# Patient Record
Sex: MALE | Race: WHITE | Employment: FULL TIME | URBAN - METROPOLITAN AREA
[De-identification: names, ages, dates, MRNs, and addresses within clinical notes are randomized per-mention and may not be internally consistent; named-entity substitution may affect disease eponyms.]

---

## 2019-01-17 ENCOUNTER — OFFICE VISIT (OUTPATIENT)
Dept: ORTHOPEDIC SURGERY | Age: 34
End: 2019-01-17

## 2019-01-17 VITALS
HEART RATE: 69 BPM | DIASTOLIC BLOOD PRESSURE: 71 MMHG | TEMPERATURE: 97.6 F | HEIGHT: 72 IN | WEIGHT: 234 LBS | BODY MASS INDEX: 31.69 KG/M2 | SYSTOLIC BLOOD PRESSURE: 133 MMHG | OXYGEN SATURATION: 97 % | RESPIRATION RATE: 16 BRPM

## 2019-01-17 DIAGNOSIS — S76.319A HAMSTRING STRAIN, INITIAL ENCOUNTER: Primary | ICD-10-CM

## 2019-01-17 RX ORDER — MELOXICAM 15 MG/1
15 TABLET ORAL
Qty: 30 TAB | Refills: 1 | Status: SHIPPED | OUTPATIENT
Start: 2019-01-17

## 2019-01-17 NOTE — PROGRESS NOTES
Jl Bell 1985 Chief Complaint Patient presents with  Leg Pain LEFT LEG PAIN  
  
 
HISTORY OF PRESENT ILLNESS Jl Bell is a 28 y.o. male who presents today for evaluation of left leg pain. He rates his pain 5/10 today. Pain has been present since 1/16/19 when he was playing hockey. He states he was playing [employer] and did the splits which resulted in sharp pain. Pain with walking and standing, he reports a limp. Patient describes the pain as aching and sharp that is Constant in nature. Symptoms are worse with standing and walking, sitting, squatting, Activity and is better with  nothing . Associated symptoms include bruising, Swelling. Since problem started, it: is unchanged. Pain does not wake patient up at night. Has taken no meds for the problem. Has tried following treatments: Injections:NO; Brace:NO; Therapy:NO; Cane/Crutch:NO No Known Allergies Past Medical History:  
Diagnosis Date  Kidney stones  Patellar instability of left knee 4/11/2016 Social History Socioeconomic History  Marital status:  Spouse name: Not on file  Number of children: Not on file  Years of education: Not on file  Highest education level: Not on file Social Needs  Financial resource strain: Not on file  Food insecurity - worry: Not on file  Food insecurity - inability: Not on file  Transportation needs - medical: Not on file  Transportation needs - non-medical: Not on file Occupational History  Not on file Tobacco Use  Smoking status: Never Smoker  Smokeless tobacco: Never Used Substance and Sexual Activity  Alcohol use: Yes Comment: occassionally  Drug use: No  
 Sexual activity: Yes  
  Partners: Female Other Topics Concern  Not on file Social History Narrative  Not on file Past Surgical History:  
Procedure Laterality Date  HX HEENT  2001  
 jaw surgery  HX HEENT    
 tubes in ears [address] [address] Family History Problem Relation Age of Onset  Elevated Lipids Father  Diabetes Maternal Grandmother Current Outpatient Medications Medication Sig  
 albuterol (PROVENTIL HFA, VENTOLIN HFA, PROAIR HFA) 90 mcg/actuation inhaler Take 2 Puffs by inhalation every four (4) hours as needed for Wheezing.  guaiFENesin-codeine (ROBITUSSIN AC)  mg/5 mL solution Take 5 mL by mouth nightly as needed for Cough. Max Daily Amount: 5 mL. No current facility-administered medications for this visit. REVIEW OF SYSTEM Patient denies: Weight loss, Fever/Chills, HA, Visual changes, Fatigue, Chest pain, SOB, Abdominal pain, N/V/D/C, Blood in stool or urine, Edema. Pertinent positive as above in HPI. All others were negative PHYSICAL EXAM:  
Visit Vitals /71 Pulse 69 Temp 97.6 °F (36.4 °C) (Oral) Resp 16 Ht 6' (1.829 m) Wt 234 lb (106.1 kg) SpO2 97% BMI 31.74 kg/m² The patient is a well-developed, well-nourished male   in no acute distress. The patient is alert and oriented times three. The patient is alert and oriented times three. Mood and affect are normal. 
LYMPHATIC: lymph nodes are not enlarged and are within normal limits SKIN: normal in color and non tender to palpation. There are no bruises or abrasions noted. NEUROLOGICAL: Motor sensory exam is within normal limits. Reflexes are equal bilaterally. There is normal sensation to pinprick and light touch MUSCULOSKELETAL: 
Tender at the base of the hamstring insertion with no palpable defect IMPRESSION:   
  ICD-10-CM ICD-9-CM 1. Hamstring strain, initial encounter S76.319A 843.8 REFERRAL TO PHYSICAL THERAPY  
   meloxicam (MOBIC) 15 mg tablet PLAN: 
1. The patient presents today with left hamstring strain and I would like him to begin PT and use crutches, weight bearing as tolerated. Risk factors include: n/a 2. No ultrasound exam indicated today 3. No cortisone injection indicated today 4. Yes Physical Therapy indicated today 5. No diagnostic test indicated today:  
6. Yes durable medical equipment indicated today TWO CRUTCHES 7. No referral indicated today 8. Yes medications indicated today: MOBIC 9. No Narcotic indicated today RTC 2 weeks Follow-up Disposition: Not on File Scribed by Rodrick Londono ([address]) as dictated by Denise Mahoney MD 
 
I, Dr. Denise Mahoney, confirm that all documentation is accurate.  
 
Denise Mahoney M.D.  
[facility]

## 2019-01-17 NOTE — PROGRESS NOTES
1. Have you been to the ER, urgent care clinic since your last visit? Hospitalized since your last visit? No 
 
2. Have you seen or consulted any other health care providers outside of the [address] [place] [address] since your last visit? Include any pap smears or colon screening.  No

## 2019-02-22 ENCOUNTER — HOSPITAL ENCOUNTER (OUTPATIENT)
Dept: PHYSICAL THERAPY | Age: 34
Discharge: HOME | End: 2019-02-22
Payer: COMMERCIAL

## 2019-02-22 PROCEDURE — 97110 THERAPEUTIC EXERCISES: CPT

## 2019-02-22 PROCEDURE — 97161 PT EVAL LOW COMPLEX 20 MIN: CPT

## 2019-02-22 NOTE — PROGRESS NOTES
[facility] 
[address] 
[phone] fax Plan of Care/ Statement of Necessity for Physical Therapy Services Patient name: Tono Gutierrez Start of Care: 2019 Referral source: Efrain Scott,* : 1985 Medical Diagnosis: Left leg pain [M79.605] Strain of muscle, fascia and tendon of the posterior muscle group at thigh level, unspecified thigh, initial encounter [R94.526M] Payor: Constantine Mays / [place] / Product Type: PPO /  Onset Date: 2019 Treatment Diagnosis: left posterior thigh pain Prior Hospitalization: see medical history Provider#: [id number] Medications: Verified on Patient summary List  
 Comorbidities: hx torn right hip adductor (2-3x), hx left knee dislocation (2x in the last year) Prior Level of Function: Independent with ADLs, functional, work, and recreational tasks with no limitations. The Plan of Care and following information is based on the information from the initial evaluation. Assessment/ key information:  
Pt is a 28year old male who presents to therapy today with left posterior thigh pain. Pt states that his symptoms began in 2019 when he was playing hockey and he went from a straddle to a spilt and felt a pop in the back of his thigh. Pt reports having bruising in his distal left thigh afterwards. Pt reports having tingling in the left foot with prolonged sitting. Pt reports having pain with sitting, stairs, decreased flexibility, and unable to ice skate fast. Pt demonstrated decreased left HS flexibility, decreased strength, and tenderness to palpation. Pt would benefit from physical therapy to improve the above impairments to help the pt return to performing ADLs, functional, work, and recreational activities.   
 
Evaluation Complexity History MEDIUM  Complexity : 1-2 comorbidities / personal factors will impact the outcome/ POC ; Examination MEDIUM Complexity : 3 Standardized tests and measures addressing body structure, function, activity limitation and / or participation in recreation  ;Presentation LOW Complexity : Stable, uncomplicated  ;Clinical Decision Making MEDIUM Complexity : FOTO score of 26-74 Overall Complexity Rating: LOW Problem List: pain affecting function, decrease ROM, decrease strength, impaired gait/ balance, decrease ADL/ functional abilitiies, decrease activity tolerance, decrease flexibility/ joint mobility and decrease transfer abilities Treatment Plan may include any combination of the following: Therapeutic exercise, Therapeutic activities, Neuromuscular re-education, Physical agent/modality, Gait/balance training, Manual therapy, Patient education, Self Care training, Functional mobility training, Home safety training and Stair training Patient / Family readiness to learn indicated by: asking questions, trying to perform skills and interest 
Persons(s) to be included in education: patient (P) Barriers to Learning/Limitations: None Patient Goal (s): regain flexibility, be able to sit on hard surface Patient Self Reported Health Status: fair Rehabilitation Potential: good Short Term Goals: To be accomplished in 2 weeks: 1. Pt will report compliance and independence to [facility] to help the pt manage their pain and symptoms. Long Term Goals: To be accomplished in 10 weeks: 1. Pt will increase FOTO score to 77 points to improve ability to perform ADLs. 2. Pt will increase MMT B hip EXT to 4+/5, left hip IR to 4+/5 to improve ability to tolerate ice skating. 3. Pt will improve 90/90 HS on the left 25 degs or less to improve flexibility needed for hockey activities. 4. Pt will report being able to sit on a hard surface for 20 mins with minimal to no increased left LE pain/tingling to improve sitting tolerance for work tasks. Frequency / Duration: Patient to be seen 2 times per week for 10 treatments. Patient/ Caregiver education and instruction: Diagnosis, prognosis, self care, activity modification and exercises 
 [x]  Plan of care has been reviewed with PTA Aguila Hart, PT 2/22/2019 4:46 PM 
_____________________________________________________________________ I certify that the above Therapy Services are being furnished while the patient is under my care. I agree with the treatment plan and certify that this therapy is necessary. [de-identified] Signature:____________Date:_________TIME:________ 
 
[employer], Date and Time must be completed for valid certification ** Please sign and return to [facility] NIYAH MOTLEY [address] 
[phone] fax

## 2019-02-22 NOTE — PROGRESS NOTES
PT DAILY TREATMENT NOTE - [facility]  Patient Name: Vicky Lomeli Date:2019 : 1985 [x]  Patient  Verified Payor: BLUE CROSS / Plan: [place] [employer] / Product Type: PPO / In time:3:37  Out time:4:11 Total Treatment Time (min): 34 Visit #: 1 of 10 Medicare/BCBS Only Total Timed Codes (min):  15 1:1 Treatment Time:  34 Treatment Area: Left leg pain [M79.605] Strain of muscle, fascia and tendon of the posterior muscle group at thigh level, unspecified thigh, initial encounter [O30.346U] SUBJECTIVE Pain Level (0-10 scale): 0 Any medication changes, allergies to medications, adverse drug reactions, diagnosis change, or new procedure performed?: [x] No    [] Yes (see summary sheet for update) Subjective functional status/changes:   [] No changes reported See POC OBJECTIVE 19 min [x]Eval                  []Re-Eval  
 
15 min Therapeutic Exercise:  [] See flow sheet : HEP instruction and demonstration, pt education regarding anatomy and physiology of the LEs and how it relates to the pt's condition. Rationale: increase ROM and increase strength to improve the patients ability to tolerate ADLs With 
 [] TE 
 [] TA 
 [] neuro 
 [] other: Patient Education: [x] Review HEP [] Progressed/Changed HEP based on:  
[] positioning   [] body mechanics   [] transfers   [] heat/ice application   
[] other:   
 
Other Objective/Functional Measures: See evaluation. Pain Level (0-10 scale) post treatment: 0 
 
ASSESSMENT/Changes in Function: Pt given HEP handout to perform. Pt understood exercises in HEP handout. Pt demonstrated decreased left HS flexibility, decreased strength, and tenderness to palpation. Pt would benefit from physical therapy to improve the above impairments to help the pt return to performing ADLs, functional, work, and recreational activities.   
 
Patient will continue to benefit from skilled PT services to modify and progress therapeutic interventions, address functional mobility deficits, address ROM deficits, address strength deficits, analyze and address soft tissue restrictions, analyze and cue movement patterns, analyze and modify body mechanics/ergonomics, assess and modify postural abnormalities, address imbalance/dizziness and instruct in home and community integration to attain remaining goals. [x]  See Plan of Care 
[]  See progress note/recertification 
[]  See Discharge Summary Progress towards goals / Updated goals: 
Short Term Goals: To be accomplished in 2 weeks: 1. Pt will report compliance and independence to [facility] to help the pt manage their pain and symptoms. Eval: Established Long Term Goals: To be accomplished in 10 weeks: 1. Pt will increase FOTO score to 77 points to improve ability to perform ADLs. Eval: 63 points 2. Pt will increase MMT B hip EXT to 4+/5, left hip IR to 4+/5 to improve ability to tolerate ice skating. Eval: B hip EXT 4/5 with pain in the left ischial tuberosity, left hip IR 4-/5 3. Pt will improve 90/90 HS on the left 25 degs or less to improve flexibility needed for hockey activities. Eval: 35 degs 4. Pt will report being able to sit on a hard surface for 20 mins with minimal to no increased left LE pain/tingling to improve sitting tolerance for work tasks. Eval: unable to sit for more than a few mins before pain/tingling in the left LE and foot. PLAN [x]  Upgrade activities as tolerated     [x]  Continue plan of care [x]  Update interventions per flow sheet      
[]  Discharge due to:_ 
[]  Other:_ Adrian Franklin, PT 2/22/2019  4:49 PM 
 
Future Appointments Date Time Provider Nini Guevara 2/22/2019  3:30 PM Corrine Parnell, PT [facility]

## 2019-03-01 ENCOUNTER — HOSPITAL ENCOUNTER (OUTPATIENT)
Dept: PHYSICAL THERAPY | Age: 34
Discharge: HOME | End: 2019-03-01
Payer: COMMERCIAL

## 2019-03-01 PROCEDURE — 97112 NEUROMUSCULAR REEDUCATION: CPT

## 2019-03-01 NOTE — PROGRESS NOTES
PT DAILY TREATMENT NOTE - [facility]  Patient Name: Dee Burleson Date:3/1/2019 : 1985 [x]  Patient  Verified Payor: BLUE CROSS / Plan: [place] [employer] / Product Type: PPO / In time: 3:35   Out time: 4:24 Total Treatment Time (min): 49 Visit #: 2 of 10 Medicare/BCBS Only Total Timed Codes (min): 49 1:1 Treatment Time: 44 Treatment Area: Left leg pain [M79.605] Strain of muscle, fascia and tendon of the posterior muscle group at thigh level, unspecified thigh, initial encounter [J51.926C] SUBJECTIVE Pain Level (0-10 scale): 0 Any medication changes, allergies to medications, adverse drug reactions, diagnosis change, or new procedure performed?: [x] No    [] Yes (see summary sheet for update) Subjective functional status/changes:   [] No changes reported Pt reports that he still has pain with prolonged sitting. Pt reports compliance with HEP. OBJECTIVE 49 min Neuromuscular Re-education:  [x]  See flow sheet :  
Rationale: increase ROM, increase strength, improve coordination, improve balance and increase proprioception  to improve the patients ability to tolerate ADLs With 
 [] TE 
 [] TA 
 [] neuro 
 [] other: Patient Education: [x] Review HEP [] Progressed/Changed HEP based on:  
[] positioning   [] body mechanics   [] transfers   [] heat/ice application   
[] other:   
 
Other Objective/Functional Measures: Initiated exercises/interventions in flow sheet. Pain Level (0-10 scale) post treatment: 0 
 
ASSESSMENT/Changes in Function: Reported no pain post session. Held some exercises secondary to pt needing to leave therapy early. Educated pt to focus on eccentric control of the HS during exercises. Educated pt to perform exercises to tolerance and to avoid elevated pain levels. Continue POC as tolerated.   
 
Patient will continue to benefit from skilled PT services to modify and progress therapeutic interventions, address functional mobility deficits, address ROM deficits, address strength deficits, analyze and address soft tissue restrictions, analyze and cue movement patterns, analyze and modify body mechanics/ergonomics, assess and modify postural abnormalities, address imbalance/dizziness and instruct in home and community integration to attain remaining goals. []  See Plan of Care 
[]  See progress note/recertification 
[]  See Discharge Summary Progress towards goals / Updated goals: 
Short Term Goals: To be accomplished in 2 weeks: 1. Pt will report compliance and independence to [facility] to help the pt manage their pain and symptoms. Eval: Established Current: reports compliance Long Term Goals: To be accomplished in 10 weeks: 1. Pt will increase FOTO score to 77 points to improve ability to perform ADLs. Eval: 63 points 2. Pt will increase MMT B hip EXT to 4+/5, left hip IR to 4+/5 to improve ability to tolerate ice skating. Eval: B hip EXT 4/5 with pain in the left ischial tuberosity, left hip IR 4-/5 3. Pt will improve 90/90 HS on the left 25 degs or less to improve flexibility needed for hockey activities. Eval: 35 degs 4. Pt will report being able to sit on a hard surface for 20 mins with minimal to no increased left LE pain/tingling to improve sitting tolerance for work tasks. Eval: unable to sit for more than a few mins before pain/tingling in the left LE and foot. PLAN [x]  Upgrade activities as tolerated     [x]  Continue plan of care [x]  Update interventions per flow sheet      
[]  Discharge due to:_ 
[]  Other:_ Juliana Burt, PT 3/1/2019  3:47 PM 
 
Future Appointments Date Time Provider Nini Guevara 3/1/2019  3:30 PM Alexis Mayer, ASHLEIGH [facility]  
3/6/2019  5:45 PM Colleen Macario [facility]  
3/8/2019  3:30 PM Colleen Macario [facility]  
3/13/2019  5:45 PM Wally, [address]  
 3/15/2019  3:30 PM Robin Mcdonald PT [facility]

## 2019-03-06 ENCOUNTER — HOSPITAL ENCOUNTER (OUTPATIENT)
Dept: PHYSICAL THERAPY | Age: 34
Discharge: HOME | End: 2019-03-06
Payer: COMMERCIAL

## 2019-03-06 PROCEDURE — 97112 NEUROMUSCULAR REEDUCATION: CPT

## 2019-03-06 NOTE — PROGRESS NOTES
PT DAILY TREATMENT NOTE 10-18    Patient Name: Jl Bell  Date:3/6/2019  : 1985  [x]  Patient  Verified  Payor: BLUE CROSS / Plan: [employer] [facility] / Product Type: PPO /    In time:5;45  Out time:5;25  Total Treatment Time (min): 40  Visit #: 3 of 10    Medicare/BCBS Only   Total Timed Codes (min):  40 1:1 Treatment Time:  40       Treatment Area: Left leg pain [M79.605]  Strain of muscle, fascia and tendon of the posterior muscle group at thigh level, unspecified thigh, initial encounter [S76.319A]    SUBJECTIVE  Pain Level (0-10 scale): 0  Any medication changes, allergies to medications, adverse drug reactions, diagnosis change, or new procedure performed?: [x] No    [] Yes (see summary sheet for update)  Subjective functional status/changes:   [] No changes reported      OBJECTIVE     40 min Neuromuscular Re-education:  []  See flow sheet :   Rationale: increase strength and improve coordination  to improve the patients ability to increase stability in HS, to resume hockey          With   [] TE   [] TA   [] neuro   [] other: Patient Education: [x] Review HEP    [] Progressed/Changed HEP based on:   [] positioning   [] body mechanics   [] transfers   [] heat/ice application    [] other:      Other Objective/Functional Measures:      Pain Level (0-10 scale) post treatment: 0    ASSESSMENT/Changes in Function:  Mild pain with LE spring series, circles,    Patient will continue to benefit from skilled PT services to modify and progress therapeutic interventions, address functional mobility deficits, address ROM deficits, address strength deficits, analyze and address soft tissue restrictions, analyze and cue movement patterns, analyze and modify body mechanics/ergonomics, assess and modify postural abnormalities, address imbalance/dizziness and instruct in home and community integration to attain remaining goals.      []  See Plan of Care  []  See progress note/recertification  []  See Discharge Summary         Progress towards goals / Updated goals:  . Pt will report compliance and independence to [facility] to help the pt manage their pain and symptoms.    Eval: Established         Current: reports compliance   Long Term Goals: To be accomplished in 10 weeks:  1. Pt will increase FOTO score to 77 points to improve ability to perform ADLs. Eval: 63 points  2. Pt will increase MMT B hip EXT to 4+/5, left hip IR to 4+/5 to improve ability to tolerate ice skating. Eval: B hip EXT 4/5 with pain in the left ischial tuberosity, left hip IR 4-/5  3. Pt will improve 90/90 HS on the left 25 degs or less to improve flexibility needed for hockey activities. Eval: 35 degs  4. Pt will report being able to sit on a hard surface for 20 mins with minimal to no increased left LE pain/tingling to improve sitting tolerance for work tasks.   Eval: unable to sit for more than a few mins before pain/tingling in the left LE and foot.      PLAN  [x]  Upgrade activities as tolerated     []  Continue plan of care  []  Update interventions per flow sheet       []  Discharge due to:_  []  Other:_      Chavo Vila, LAZARA 3/6/2019  5:59 PM    Future Appointments   Date Time Provider Nini Guevara   3/8/2019  3:30 PM Hannah Baron [facility]   3/13/2019  5:45 PM Hannah Baron [facility]   3/15/2019  3:30 PM Bobby Jenkins, PT [facility]

## 2019-03-08 ENCOUNTER — HOSPITAL ENCOUNTER (OUTPATIENT)
Dept: PHYSICAL THERAPY | Age: 34
Discharge: HOME | End: 2019-03-08
Payer: COMMERCIAL

## 2019-03-08 PROCEDURE — 97112 NEUROMUSCULAR REEDUCATION: CPT

## 2019-03-08 NOTE — PROGRESS NOTES
PT DAILY TREATMENT NOTE 10-18    Patient Name: Camille Blackmon  Date:3/8/2019  : 1985  [x]  Patient  Verified  Payor: BLUE CROSS / Plan: [facility] / Product Type: PPO /    In time:3;30  Out time:4;25  Total Treatment Time (min): 55  Visit #: 4 of 10    Medicare/BCBS Only   Total Timed Codes (min):  55 1:1 Treatment Time:  54       Treatment Area: Left leg pain [M79.605]  Strain of muscle, fascia and tendon of the posterior muscle group at thigh level, unspecified thigh, initial encounter [S76.319A]    SUBJECTIVE  Pain Level (0-10 scale): 0  Any medication changes, allergies to medications, adverse drug reactions, diagnosis change, or new procedure performed?: [x] No    [] Yes (see summary sheet for update)  Subjective functional status/changes:   [] No changes reported  Sore from rushing to get here, but not pain     OBJECTIVE    55 min Neuromuscular Re-education:  []  See flow sheet :   Rationale: increase strength and improve coordination  to improve the patients ability to improve HS strength, sitting tolerance, ease of playing hockey          With   [] TE   [] TA   [] neuro   [] other: Patient Education: [x] Review HEP    [] Progressed/Changed HEP based on:   [] positioning   [] body mechanics   [] transfers   [] heat/ice application    [] other:      Other Objective/Functional Measures:      Pain Level (0-10 scale) post treatment:  0    ASSESSMENT/Changes in Function: reports soreness into ischial tuberosity with sitting, but mild innature.     Patient will continue to benefit from skilled PT services to modify and progress therapeutic interventions, address functional mobility deficits, address ROM deficits, address strength deficits, analyze and address soft tissue restrictions, analyze and cue movement patterns, analyze and modify body mechanics/ergonomics, assess and modify postural abnormalities, address imbalance/dizziness and instruct in home and community integration to attain remaining goals. []  See Plan of Care  []  See progress note/recertification  []  See Discharge Summary         Progress towards goals / Updated goals:  . Pt will report compliance and independence to [facility] to help the pt manage their pain and symptoms.    Eval: Established         Current: reports compliance  Long Term Goals: To be accomplished in 10 weeks:  1. Pt will increase FOTO score to 77 points to improve ability to perform ADLs. Eval: 63 points  2. Pt will increase MMT B hip EXT to 4+/5, left hip IR to 4+/5 to improve ability to tolerate ice skating. Eval: B hip EXT 4/5 with pain in the left ischial tuberosity, left hip IR 4-/5  3. Pt will improve 90/90 HS on the left 25 degs or less to improve flexibility needed for hockey activities. Eval: 35 degs  4. Pt will report being able to sit on a hard surface for 20 mins with minimal to no increased left LE pain/tingling to improve sitting tolerance for work tasks. Eval: unable to sit for more than a few mins before pain/tingling in the left LE and foot.      PLAN  [x]  Upgrade activities as tolerated     []  Continue plan of care  []  Update interventions per flow sheet       []  Discharge due to:_  []  Other:_      Kelli Granados, LAZARA 3/8/2019  3:31 PM    Future Appointments   Date Time Provider Nini Guevara   3/13/2019  5:45 PM [facility]   3/15/2019  3:30 PM Jerson Villalta, ASHLEIGH [facility]

## 2019-03-13 ENCOUNTER — APPOINTMENT (OUTPATIENT)
Dept: PHYSICAL THERAPY | Age: 34
End: 2019-03-13
Payer: COMMERCIAL

## 2019-03-15 ENCOUNTER — HOSPITAL ENCOUNTER (OUTPATIENT)
Dept: PHYSICAL THERAPY | Age: 34
Discharge: HOME | End: 2019-03-15
Payer: COMMERCIAL

## 2019-03-15 PROCEDURE — 97112 NEUROMUSCULAR REEDUCATION: CPT

## 2019-03-15 NOTE — PROGRESS NOTES
PT DAILY TREATMENT NOTE 10-18    Patient Name: Silas Martinez  Date:3/15/2019  : 1985  [x]  Patient  Verified  Payor: BLUE CROSS / Plan: [facility] / Product Type: PPO /    In time:327  Out time:427  Total Treatment Time (min): 60  Visit #: 5 of 10    Medicare/BCBS Only   Total Timed Codes (min):  60 1:1 Treatment Time:  45       Treatment Area: Left leg pain [M79.605]  Strain of muscle, fascia and tendon of the posterior muscle group at thigh level, unspecified thigh, initial encounter [S76.425A]    SUBJECTIVE  Pain Level (0-10 scale): 0  Any medication changes, allergies to medications, adverse drug reactions, diagnosis change, or new procedure performed?: [x] No    [] Yes (see summary sheet for update)  Subjective functional status/changes:   [] No changes reported  It's just the usual, like if I sit down and lean to my left side it hurts.      OBJECTIVE    Modality rationale:    Type Additional Details   [] Estim:  []Unatt       []IFC  []Premod                        []Other:  []w/ice   []w/heat  Position:  Location:   [] Estim: []Att    []TENS instruct  []NMES                    []Other:  []w/US   []w/ice   []w/heat  Position:  Location:   []  Traction: [] Cervical       []Lumbar                       [] Prone          []Supine                       []Intermittent   []Continuous Lbs:  [] before manual  [] after manual   []  Ultrasound: []Continuous   [] Pulsed                           []1MHz   []3MHz W/cm2:  Location:   []  Iontophoresis with dexamethasone         Location: [] Take home patch   [] In clinic   []  Ice     []  heat  []  Ice massage  []  Laser   []  Anodyne Position:  Location:   []  Laser with stim  []  Other:  Position:  Location:   []  Vasopneumatic Device Pressure:       [] lo [] med [] hi   Temperature: [] lo [] med [] hi   [] Skin assessment post-treatment:  []intact []redness- no adverse reaction    []redness  adverse reaction:     60 min Neuromuscular Re-education: [x]  See flow sheet :   Rationale: increase strength and improve coordination  to improve the patients ability to improve LE stability in order to improve ease of recreational activities     With   [] TE   [] TA   [] neuro   [] other: Patient Education: [x] Review HEP    [] Progressed/Changed HEP based on:   [] positioning   [] body mechanics   [] transfers   [] heat/ice application    [] other:      Other Objective/Functional Measures: progressed routine per flow sheet     Pain Level (0-10 scale) post treatment: 0    ASSESSMENT/Changes in Function: Progressed routine today to incorporate more hip strengthening and stability activities. No pain. Good improvement in hip strength with MMT. Patient will continue to benefit from skilled PT services to modify and progress therapeutic interventions, address functional mobility deficits, address ROM deficits, address strength deficits, analyze and address soft tissue restrictions, analyze and cue movement patterns, analyze and modify body mechanics/ergonomics, assess and modify postural abnormalities, address imbalance/dizziness and instruct in home and community integration to attain remaining goals. []  See Plan of Care  []  See progress note/recertification  []  See Discharge Summary         Progress towards goals / Updated goals:  . Pt will report compliance and independence to HEP to help the pt manage their pain and symptoms.    Eval: Established         Current: reports compliance  Long Term Goals: To be accomplished in 10 weeks:  1. Pt will increase FOTO score to 77 points to improve ability to perform ADLs. Eval: 63 points  Current status: reassess at MD note  2. Pt will increase MMT B hip EXT to 4+/5, left hip IR to 4+/5 to improve ability to tolerate ice skating. Eval: B hip EXT 4/5 with pain in the left ischial tuberosity, left hip IR 4-/5  Current status: met, 5/5  3.  Pt will improve 90/90 HS on the left 25 degs or less to improve flexibility needed for hockey activities. Eval: 35 degs  Current status: progressing, 32 deg  4. Pt will report being able to sit on a hard surface for 20 mins with minimal to no increased left LE pain/tingling to improve sitting tolerance for work tasks. Eval: unable to sit for more than a few mins before pain/tingling in the left LE and foot.    Current status: progressing, continues to have pain with hard surfaces    PLAN  [x]  Upgrade activities as tolerated     [x]  Continue plan of care  []  Update interventions per flow sheet       []  Discharge due to:_  []  Other:_      Rawland Lesches, PT 3/15/2019  10:24 AM    Future Appointments   Date Time Provider Nini Guevara   3/15/2019  3:30 PM Kaley Britt, PT [facility]

## 2019-03-20 ENCOUNTER — HOSPITAL ENCOUNTER (OUTPATIENT)
Dept: PHYSICAL THERAPY | Age: 34
Discharge: HOME | End: 2019-03-20
Payer: COMMERCIAL

## 2019-03-20 PROCEDURE — 97112 NEUROMUSCULAR REEDUCATION: CPT

## 2019-03-20 NOTE — PROGRESS NOTES
PT DAILY TREATMENT NOTE 10-18 Patient Name: iKeran Izaguirre Date:3/20/2019 : 1985 [x]  Patient  Verified Payor: BLUE CROSS / [place] [facility] / Product Type: PPO / In time:5:25  Out time:6;25 Total Treatment Time (min): 60 Visit #: 6 of 10 Medicare/BCBS Only Total Timed Codes (min):  60 1:1 Treatment Time:  50 Treatment Area: Left leg pain [M79.605] Strain of muscle, fascia and tendon of the posterior muscle group at thigh level, unspecified thigh, initial encounter [H86.164S] SUBJECTIVE Pain Level (0-10 scale): 0 Any medication changes, allergies to medications, adverse drug reactions, diagnosis change, or new procedure performed?: [x] No    [] Yes (see summary sheet for update) Subjective functional status/changes:   [] No changes reported 
I'm doing OK. No pain OBJECTIVE 60 min Neuromuscular Re-education:  []  See flow sheet :  
Rationale: increase strength and improve coordination  to improve the patients ability to increase hip/knee stability to allow pt to resume hockey With 
 [] TE 
 [] TA 
 [] neuro 
 [] other: Patient Education: [x] Review HEP [] Progressed/Changed HEP based on:  
[] positioning   [] body mechanics   [] transfers   [] heat/ice application   
[] other:   
 
Other Objective/Functional Measures:   
 
Pain Level (0-10 scale) post treatment:  0 
 
ASSESSMENT/Changes in Function: Continues to progress with strength,  Still has pain with sitting on hard surfaces Patient will continue to benefit from skilled PT services to modify and progress therapeutic interventions, address functional mobility deficits, address ROM deficits, address strength deficits, analyze and address soft tissue restrictions, analyze and cue movement patterns, analyze and modify body mechanics/ergonomics, assess and modify postural abnormalities, address imbalance/dizziness and instruct in home and community integration to attain remaining goals. []  See Plan of Care 
[]  See progress note/recertification 
[]  See Discharge Summary Progress towards goals / Updated goals: 
. Pt will report compliance and independence to [facility] to help the pt manage their pain and symptoms.   
Eval: Established        
Current: reports compliance Long Term Goals: To be accomplished in 10 weeks: 1. Pt will increase FOTO score to 77 points to improve ability to perform ADLs. Eval: 63 points Current status: reassess at MD note 2. Pt will increase MMT B hip EXT to 4+/5, left hip IR to 4+/5 to improve ability to tolerate ice skating. Eval: B hip EXT 4/5 with pain in the left ischial tuberosity, left hip IR 4-/5 Current status: met, 5/5 3. Pt will improve 90/90 HS on the left 25 degs or less to improve flexibility needed for hockey activities. Eval: 35 degs Current status: progressing, 32 deg 4. Pt will report being able to sit on a hard surface for 20 mins with minimal to no increased left LE pain/tingling to improve sitting tolerance for work tasks. Eval: unable to sit for more than a few mins before pain/tingling in the left LE and foot.  
Current status: progressing, continues to have pain with hard surfaces PLAN [x]  Upgrade activities as tolerated     []  Continue plan of care 
[]  Update interventions per flow sheet      
[]  Discharge due to:_ 
[]  Other:_ Kelli Granados, LAZARA 3/20/2019  5:59 PM 
 
Future Appointments Date Time Provider Nini Guevara 3/27/2019  5:00 PM Shelby Rosales [facility]  
3/28/2019  3:30 PM Ming Alfaro, PT [facility]  
4/3/2019  5:00 PM Billie Sanchez [facility]  
4/5/2019  3:30 PM Jerson Villalta, PT [facility]

## 2019-03-27 ENCOUNTER — HOSPITAL ENCOUNTER (OUTPATIENT)
Dept: PHYSICAL THERAPY | Age: 34
Discharge: HOME | End: 2019-03-27
Payer: COMMERCIAL

## 2019-03-27 PROCEDURE — 97110 THERAPEUTIC EXERCISES: CPT

## 2019-03-27 NOTE — PROGRESS NOTES
PT DAILY TREATMENT NOTE 10-18 Patient Name: Mati Hall Date:3/27/2019 : 1985 [x]  Patient  Verified Payor: BLUE CROSS / [place] [facility] / Product Type: PPO / In time:5:08  Out time:5:46 Total Treatment Time (min): 38 Visit #: 7 of 10 Medicare/BCBS Only Total Timed Codes (min):  38 1:1 Treatment Time:  45 Treatment Area: Left leg pain [M79.605] Strain of muscle, fascia and tendon of the posterior muscle group at thigh level, unspecified thigh, initial encounter [I68.755Q] SUBJECTIVE Pain Level (0-10 scale): 0/10 Any medication changes, allergies to medications, adverse drug reactions, diagnosis change, or new procedure performed?: [x] No    [] Yes (see summary sheet for update) Subjective functional status/changes:   [] No changes reported \"I don't have any pain as long as I'm not climbing up and down ladders\" OBJECTIVE 38 min Therapeutic Exercise:  [] See flow sheet :  
Rationale: increase ROM and increase strength to improve the patients ability to perform ADL's without pain. With 
 [] TE 
 [] TA 
 [] neuro 
 [] other: Patient Education: [x] Review HEP [] Progressed/Changed HEP based on:  
[] positioning   [] body mechanics   [] transfers   [] heat/ice application   
[] other:   
 
Other Objective/Functional Measures: Pt performed TE's per flow sheet without increased pain. Pain Level (0-10 scale) post treatment: 0/10 ASSESSMENT/Changes in Function: Pt performed TE's requiring little cueing. Pt performed well without an increase of symptoms. Pt is progressing towards LTG#4, see below. Patient will continue to benefit from skilled PT services to modify and progress therapeutic interventions, address functional mobility deficits, address ROM deficits, address strength deficits, analyze and address soft tissue restrictions and analyze and cue movement patterns to attain remaining goals. []  See Plan of Care []  See progress note/recertification 
[]  See Discharge Summary Progress towards goals / Updated goals: 
. Pt will report compliance and independence to [facility] to help the pt manage their pain and symptoms.   
Eval: Established        
Current: reports compliance Long Term Goals: To be accomplished in 10 weeks: 1. Pt will increase FOTO score to 77 points to improve ability to perform ADLs. Eval: 63 points Current status: reassess at MD note 2. Pt will increase MMT B hip EXT to 4+/5, left hip IR to 4+/5 to improve ability to tolerate ice skating. Eval: B hip EXT 4/5 with pain in the left ischial tuberosity, left hip IR 4-/5 Current status: met, 5/5 3. Pt will improve 90/90 HS on the left 25 degs or less to improve flexibility needed for hockey activities. Eval: 35 degs Current status: progressing, 32 deg 4. Pt will report being able to sit on a hard surface for 20 mins with minimal to no increased left LE pain/tingling to improve sitting tolerance for work tasks. Eval: unable to sit for more than a few mins before pain/tingling in the left LE and foot.  
Current status: 3/27/19 Progressing, continues to have pain with hard surfaces. Pt. reports he can only sit 5 minutes before feeling radicular pain. PLAN 
[]  Upgrade activities as tolerated     []  Continue plan of care 
[]  Update interventions per flow sheet      
[]  Discharge due to:_ 
[]  Other:_ Britta Baird, PTA 3/27/2019  5:11 PM 
 
Future Appointments Date Time Provider Nini Guevara 3/28/2019  3:30 PM Aaron Beauchamp, PT [facility]  
4/3/2019  5:00 PM Amadou Arellano [facility]  
4/5/2019  3:30 PM Viridiana Elaine, PT [facility]

## 2019-03-28 ENCOUNTER — HOSPITAL ENCOUNTER (OUTPATIENT)
Dept: PHYSICAL THERAPY | Age: 34
Discharge: HOME | End: 2019-03-28
Payer: COMMERCIAL

## 2019-03-28 PROCEDURE — 97112 NEUROMUSCULAR REEDUCATION: CPT

## 2019-03-28 PROCEDURE — 97530 THERAPEUTIC ACTIVITIES: CPT

## 2019-03-28 NOTE — PROGRESS NOTES
[facility] 
[address] [place], [address] [place] [address] 
[phone] fax Progress Note Patient name: Toña Wade Start of Care: 2019 Referral source: Farideh Godwin,* : 1985 Medical Diagnosis: Left leg pain [M79.605] Strain of muscle, fascia and tendon of the posterior muscle group at thigh level, unspecified thigh, initial encounter [Q66.234K] Payor: Tim Pineda / [place] [facility] [place] [facility] / Product Type: PPO /  Onset Date: 2019 Treatment Diagnosis: left posterior thigh pain Prior Hospitalization: see medical history Provider#: [id number] Medications: Verified on Patient summary List  
 Comorbidities: hx torn right hip adductor (2-3x), hx left knee dislocation (2x in the last year) Prior Level of Function: Independent with ADLs, functional, work, and recreational tasks with no limitations. Visits from Start of Care: 8    Missed Visits: 0 Established Goals:          Excellent Good         Limited           None 
[x] Increased ROM   []  [x]  []  [] 
[x] Increased Strength  []  [x]  []  [] 
[x] Increased Mobility  []  []  [x]  []  
[x] Decreased Pain   []  []  [x]  [] 
[] Decreased Swelling  []  []  []  [] 
 
Key Functional Changes:  
Functional Gains: flexibility, less pain with activities Functional Deficits: pain with sitting (in certain chairs), pain with ladders, unable to run without pain. Pain         Best: 0/10 Worst: 3-4/10 Current goals: 1. Pt will report compliance and independence to HEP to help the pt manage their pain and symptoms.   
Eval: Established        
Current: MET, reports compliance Long Term Goals: To be accomplished in 10 weeks: 1. Pt will increase FOTO score to 77 points to improve ability to perform ADLs. Eval: 63 points Current status: not met, 65 points 2.  Pt will increase MMT B hip EXT to 4+/5, left hip IR to 4+/5 to improve ability to tolerate ice skating. Eval: B hip EXT 4/5 with pain in the left ischial tuberosity, left hip IR 4-/5 Current status: met, 5/5 3. Pt will improve 90/90 HS on the left 25 degs or less to improve flexibility needed for hockey activities. Eval: 35 degs Current status: MET, 24 degs 4. Pt will report being able to sit on a hard surface for 20 mins with minimal to no increased left LE pain/tingling to improve sitting tolerance for work tasks. Eval: unable to sit for more than a few mins before pain/tingling in the left LE and foot.  
Current status: not met, continues to have pain with hard surfaces. Pt. reports he can only sit 5 minutes before feeling radicular pain. Updated Goals: to be achieved in 4 weeks: 1. Pt will increase FOTO score to 77 points to improve ability to perform ADLs. PN: 65 points 2. Pt will report being able to sit on a hard surface for 20 mins with minimal to no increased left LE pain/tingling to improve sitting tolerance for work tasks. PN: continues to have pain with hard surfaces. Pt. reports he can only sit 5 minutes before feeling radicular pain. 3. Pt will report having minimal to no pain while ascending/descending ladders to improve ease of work tasks. PN: reports having increased pain in the HS with ladders 4. Pt will report a decrease in at worst pain to 2/10 to improve performance of functional activities at home. PN: 3-4/10 ASSESSMENT/RECOMMENDATIONS: 
Pt reports/demonstrates improvement in flexibility, strength, and pain since starting therapy. Pt continues to have pain with sitting, ladders, and is unable to run without pain. We will plan on progressing pt as able to improve strength, mobility and improve pt's ability to return to recreational activities. [x]Continue therapy per initial plan/protocol at a frequency of  2 x per week for 4 weeks []Continue therapy with the following recommended changes:_____________________ _____________________________________________________________________ []Discontinue therapy progressing towards or have reached established goals []Discontinue therapy due to lack of appreciable progress towards goals []Discontinue therapy due to lack of attendance or compliance []Await Physician's recommendations/decisions regarding therapy []Other:________________________________________________________________ Thank you for this referral.  
Maday Ortiz, PT 3/28/2019 3:48 PM 
NOTE TO PHYSICIAN:  PLEASE COMPLETE THE ORDERS BELOW AND  
FAX TO [facility]: [phone] If you are unable to process this request in 24 hours please contact our office: [phone] []  I have read the above report and request that my patient continue as recommended. []  I have read the above report and request that my patient continue therapy with the following changes/special instructions:________________________________________ []I have read the above report and request that my patient be discharged from therapy.  
 
[de-identified] Signature:____________Date:_________TIME:________ 
 
[facility], Date and Time must be completed for valid certification **

## 2019-03-28 NOTE — PROGRESS NOTES
PT DAILY TREATMENT NOTE 10-18 Patient Name: Domonique Ruvalcaba Date:3/28/2019 : 1985 [x]  Patient  Verified Payor: BLUE CROSS / [place] / Product Type: PPO / In time: 3:30   Out time: 4:24 Total Treatment Time (min): 54 Visit #: 8 of 10 Medicare/BCBS Only Total Timed Codes (min):  54 1:1 Treatment Time:  49  
 
 
Treatment Area: Left leg pain [M79.605] Strain of muscle, fascia and tendon of the posterior muscle group at thigh level, unspecified thigh, initial encounter [C50.015P] SUBJECTIVE Pain Level (0-10 scale): 2-3 Any medication changes, allergies to medications, adverse drug reactions, diagnosis change, or new procedure performed?: [x] No    [] Yes (see summary sheet for update) Subjective functional status/changes:   [] No changes reported Pt reports that he feels he is more flexible but continues to have pain with sitting, especially with hard surfaces. OBJECTIVE 10 min Therapeutic Activity:  [] See flow sheet : FOTO, goal reassessment Rationale: increase ROM, increase strength, improve coordination, improve balance and increase proprioception to improve the patients ability to tolerate ADLs 44 min Neuromuscular Re-education:  [x]  See flow sheet :  
Rationale: increase strength and improve coordination  to improve the patients ability to improve LE stability in order to improve ease of recreational activities With 
 [] TE 
 [] TA 
 [] neuro 
 [] other: Patient Education: [x] Review HEP [] Progressed/Changed HEP based on:  
[] positioning   [] body mechanics   [] transfers   [] heat/ice application   
[] other:   
 
Other Objective/Functional Measures: See goals below. Functional Gains: flexibility, less pain with activities Functional Deficits: pain with sitting (in certain chairs), pain with ladders, unable to run without pain. Pain       Best: 0/10 Worst: 3-4/10 Pain Level (0-10 scale) post treatment: 0 
 
 ASSESSMENT/Changes in Function: See PN. Educated pt to avoid excessive stretching and avoid activity that increases his pain and throbbing sensations. Educated pt that he can use ice on his HS when his pain is throbbing for 15-20 mins or use heat for 15-20 mins when he does not have this throbbing pain. Pt reports/demonstrates improvement in flexibility, strength, and pain since starting therapy. Pt continues to have pain with sitting, ladders, and is unable to run without pain. We will plan on progressing pt as able to improve strength, mobility and improve pt's ability to return to recreational activities. Patient will continue to benefit from skilled PT services to modify and progress therapeutic interventions, address functional mobility deficits, address ROM deficits, address strength deficits, analyze and address soft tissue restrictions, analyze and cue movement patterns, analyze and modify body mechanics/ergonomics, address imbalance/dizziness and instruct in home and community integration to attain remaining goals. []  See Plan of Care [x]  See progress note/recertification 
[]  See Discharge Summary Progress towards goals / Updated goals: 
. Pt will report compliance and independence to [facility] to help the pt manage their pain and symptoms.   
Eval: Established        
Current: MET, reports compliance Long Term Goals: To be accomplished in 10 weeks: 1. Pt will increase FOTO score to 77 points to improve ability to perform ADLs. Eval: 63 points Current status: not met, 65 points 2. Pt will increase MMT B hip EXT to 4+/5, left hip IR to 4+/5 to improve ability to tolerate ice skating. Eval: B hip EXT 4/5 with pain in the left ischial tuberosity, left hip IR 4-/5 Current status: met, 5/5 3. Pt will improve 90/90 HS on the left 25 degs or less to improve flexibility needed for hockey activities. Eval: 35 degs Current status: MET, 24 degs 4. Pt will report being able to sit on a hard surface for 20 mins with minimal to no increased left LE pain/tingling to improve sitting tolerance for work tasks. Eval: unable to sit for more than a few mins before pain/tingling in the left LE and foot.  
Current status: not met, continues to have pain with hard surfaces. Pt. reports he can only sit 5 minutes before feeling radicular pain. PLAN [x]  Upgrade activities as tolerated     [x]  Continue plan of care [x]  Update interventions per flow sheet      
[]  Discharge due to:_ 
[]  Other:_ Jose Maria Smith, PT 3/28/2019  3:35 PM 
 
Future Appointments Date Time Provider Nini Guevara 3/28/2019  3:30 PM Rosenda Browne, PT [facility]  
4/3/2019  5:00 PM Bill Dvoe [facility]  
4/5/2019  3:30 PM Florin Rivas, PT [facility]

## 2019-04-03 ENCOUNTER — APPOINTMENT (OUTPATIENT)
Dept: PHYSICAL THERAPY | Age: 34
End: 2019-04-03
Payer: COMMERCIAL

## 2019-04-05 ENCOUNTER — HOSPITAL ENCOUNTER (OUTPATIENT)
Dept: PHYSICAL THERAPY | Age: 34
Discharge: HOME | End: 2019-04-05
Payer: COMMERCIAL

## 2019-04-05 PROCEDURE — 97112 NEUROMUSCULAR REEDUCATION: CPT

## 2019-04-05 PROCEDURE — 97110 THERAPEUTIC EXERCISES: CPT

## 2019-04-05 NOTE — PROGRESS NOTES
PT DAILY TREATMENT NOTE 10-18 Patient Name: Verito Sommer Date:2019 : 1985 [x]  Patient  Verified Payor: BLUE CROSS / [place] / Product Type: PPO / In time:324  Out time:415 Total Treatment Time (min): 51 Visit #: 1 of 8 Medicare/BCBS Only Total Timed Codes (min):  51 1:1 Treatment Time:  49  
 
 
Treatment Area: Left leg pain [M79.605] Strain of muscle, fascia and tendon of the posterior muscle group at thigh level, unspecified thigh, initial encounter [I42.995E] SUBJECTIVE Pain Level (0-10 scale): 0 Any medication changes, allergies to medications, adverse drug reactions, diagnosis change, or new procedure performed?: [x] No    [] Yes (see summary sheet for update) Subjective functional status/changes:   [] No changes reported \"I don't really have any pain unless I sit on it for too long. \" OBJECTIVE Modality rationale: patient declined Min Type Additional Details  
 [] Estim:  []Unatt       []IFC  []Premod []Other:  []w/ice   []w/heat Position: Location:  
 [] Estim: []Att    []TENS instruct  []NMES []Other:  []w/US   []w/ice   []w/heat Position: Location:  
 []  Traction: [] Cervical       []Lumbar 
                     [] Prone          []Supine []Intermittent   []Continuous Lbs: 
[] before manual 
[] after manual  
 []  Ultrasound: []Continuous   [] Pulsed []1MHz   []3MHz W/cm2: 
Location:  
 []  Iontophoresis with dexamethasone Location: [] Take home patch  
[] In clinic  
 []  Ice     []  heat 
[]  Ice massage 
[]  Laser  
[]  Anodyne Position: Location:  
 []  Laser with stim 
[]  Other:  Position: Location:  
 []  Vasopneumatic Device Pressure:       [] lo [] med [] hi  
Temperature: [] lo [] med [] hi  
[] Skin assessment post-treatment:  []intact []redness- no adverse reaction 
  []redness  adverse reaction: 11 min Therapeutic Exercise:  [x] See flow sheet :  
Rationale: increase ROM and increase strength to improve the patients ability to perform ADLs 40 min Neuromuscular Re-education:  [x]  See flow sheet :  
Rationale: increase ROM, increase strength, improve coordination, improve balance and increase proprioception  to improve the patients ability to improve mobility, stance stability, gait, and return to sport With 
 [x] TE 
 [] TA [x] neuro 
 [] other: Patient Education: [x] Review HEP [] Progressed/Changed HEP based on:  
[x] positioning   [x] body mechanics   [] transfers   [] heat/ice application   
[] other:   
 
Other Objective/Functional Measures:   
 
Pain Level (0-10 scale) post treatment: 1-2 ASSESSMENT/Changes in Function: Pt reports having a little increased soreness following his treatment. Demonstrates good mechanics with most exercises. Some cuing needed to fix alignment with triple extension. Patient will continue to benefit from skilled PT services to modify and progress therapeutic interventions, address functional mobility deficits, address ROM deficits, address strength deficits, analyze and address soft tissue restrictions, analyze and cue movement patterns, analyze and modify body mechanics/ergonomics, assess and modify postural abnormalities, address imbalance/dizziness and instruct in home and community integration to attain remaining goals. [x]  See Plan of Care 
[]  See progress note/recertification 
[]  See Discharge Summary Progress towards goals / Updated goals: 1. Pt will increase FOTO score to 77 points to improve ability to perform ADLs. PN: 65 points 2. Pt will report being able to sit on a hard surface for 20 mins with minimal to no increased left LE pain/tingling to improve sitting tolerance for work tasks. PN: continues to have pain with hard surfaces. Pt. reports he can only sit 5 minutes before feeling radicular pain. 3. Pt will report having minimal to no pain while ascending/descending ladders to improve ease of work tasks. PN: reports having increased pain in the HS with ladders 4. Pt will report a decrease in at worst pain to 2/10 to improve performance of functional activities at home. PN: 3-4/10 PLAN 
[]  Upgrade activities as tolerated     [x]  Continue plan of care 
[]  Update interventions per flow sheet      
[]  Discharge due to:_ 
[]  Other:_   
 
Marco Amado, PTA, CSCS 4/5/2019  4:23 PM 
 
Future Appointments Date Time Provider Nini Guevara 4/5/2019  3:30 PM [id number]

## 2019-04-24 ENCOUNTER — HOSPITAL ENCOUNTER (OUTPATIENT)
Dept: PHYSICAL THERAPY | Age: 34
Discharge: HOME | End: 2019-04-24
Payer: COMMERCIAL

## 2019-04-24 PROCEDURE — 97112 NEUROMUSCULAR REEDUCATION: CPT

## 2019-04-24 NOTE — PROGRESS NOTES
PT DAILY TREATMENT NOTE 10-18 Patient Name: Danitza Smith Date:2019 : 1985 [x]  Patient  Verified Payor: BLUE CROSS / [place] / Product Type: PPO / In time:5;45  Out time: 6;30 Total Treatment Time (min): 45 Visit #: 2 of 8 Medicare/BCBS Only Total Timed Codes (min):  45 1:1 Treatment Time:  45  
 
 
Treatment Area: Left leg pain [M79.605] Strain of muscle, fascia and tendon of the posterior muscle group at thigh level, unspecified thigh, initial encounter [N51.931K] SUBJECTIVE Pain Level (0-10 scale): 0 Any medication changes, allergies to medications, adverse drug reactions, diagnosis change, or new procedure performed?: [x] No    [] Yes (see summary sheet for update) Subjective functional status/changes:   [] No changes reported 
I'm not having as much discomfort with sitting now. OBJECTIVE 45 min Neuromuscular Re-education:  []  See flow sheet :  
Rationale: increase strength and improve coordination  to improve the patients ability to increase stability to return to recreational activities With 
 [] TE 
 [] TA 
 [] neuro 
 [] other: Patient Education: [x] Review HEP [] Progressed/Changed HEP based on:  
[] positioning   [] body mechanics   [] transfers   [] heat/ice application   
[] other:   
 
Other Objective/Functional Measures: abbreviated program due oTC Pain Level (0-10 scale) post treatment: 0 
 
ASSESSMENT/Changes in Function: pt is progressing as he reports less pain with sitting on hard surfaces Patient will continue to benefit from skilled PT services to modify and progress therapeutic interventions, address functional mobility deficits, address ROM deficits, address strength deficits, analyze and address soft tissue restrictions, analyze and cue movement patterns, analyze and modify body mechanics/ergonomics, assess and modify postural abnormalities, address imbalance/dizziness and instruct in home and community integration to attain remaining goals. []  See Plan of Care 
[]  See progress note/recertification 
[]  See Discharge Summary Progress towards goals / Updated goals: 1. Pt will increase FOTO score to 77 points to improve ability to perform ADLs. PN: 65 points 2. Pt will report being able to sit on a hard surface for 20 mins with minimal to no increased left LE pain/tingling to improve sitting tolerance for work tasks. PN: continues to have pain with hard surfaces. Pt. reports he can only sit 5 minutes before feeling radicular pain. Progressing per pt 3. Pt will report having minimal to no pain while ascending/descending ladders to improve ease of work tasks.  
PN: reports having increased pain in the HS with ladders 4. Pt will report a decrease in at worst pain to 2/10 to improve performance of functional activities at home.  
PN: 3-4/10 
  
PLAN [x]  Upgrade activities as tolerated     []  Continue plan of care 
[]  Update interventions per flow sheet      
[]  Discharge due to:_ 
[]  Other:_ Chery Ramirez 4/24/2019  6:09 PM 
 
Future Appointments Date Time Provider Nini Guevara 4/26/2019  3:30 PM Boy Madrid, PT [facility]

## 2019-04-26 ENCOUNTER — HOSPITAL ENCOUNTER (OUTPATIENT)
Dept: PHYSICAL THERAPY | Age: 34
Discharge: HOME | End: 2019-04-26
Payer: COMMERCIAL

## 2019-04-26 PROCEDURE — 97112 NEUROMUSCULAR REEDUCATION: CPT

## 2019-04-26 NOTE — PROGRESS NOTES
PT DISCHARGE DAILY NOTE AND [id number] Date:2019 Patient name: Raymond BRUCE [facility] of Care: 2019 Referral source: NasimSamirJavon Rao Silvestre,* ZMZ:                
Medical Diagnosis: Left leg pain [M79.605] Strain of muscle, fascia and tendon of the posterior muscle group at thigh level, unspecified thigh, initial encounter [K90.036T] Payor: JOVAN KIRBY / Plan: [address] [place] [address] / Product Type: PPO /  Onset Date: 2019               
Treatment Diagnosis: left posterior thigh pain Prior Hospitalization: see medical history Provider#: [id number] Medications: Verified on Patient summary List  
 Comorbidities: hx torn right hip adductor (2-3x), hx left knee dislocation (2x in the last year) 
 Prior Level of Function: Independent with ADLs, functional, work, and recreational tasks with no limitations.  
 
Visits from Start of Care: 11    Missed Visits: 0 Reporting Period : 19 to 19 Date:2019 : 1985 [x]  Patient  Verified Payor: BLUE CROSS / [place]: [place] / Product Type: PPO / In time:330  Out time:419 Total Treatment Time (min): 49 Visit #: 3 of 8 Medicare/BCBS Only Total Timed Codes (min):  49 1:1 Treatment Time:  52 SUBJECTIVE Pain Level (0-10 scale): 0 Any medication changes, allergies to medications, adverse drug reactions, diagnosis change, or new procedure performed?: [x] No    [] Yes (see summary sheet for update) Subjective functional status/changes:   [] No changes reported I've been doing really well. Sitting really isn't an issue and I can run 3-5 miles. OBJECTIVE Modality rationale:   
Min Type Additional Details  
 [] Estim:  []Unatt       []IFC  []Premod []Other:  []w/ice   []w/heat Position: Location:  
 [] Estim: []Att    []TENS instruct  []NMES []Other:  []w/US   []w/ice   []w/heat Position: Location:  
 []  Traction: [] Cervical       []Lumbar [] Prone          []Supine []Intermittent   []Continuous Lbs: 
[] before manual 
[] after manual  
 []  Ultrasound: []Continuous   [] Pulsed []1MHz   []3MHz W/cm2: 
Location:  
 []  Iontophoresis with dexamethasone Location: [] Take home patch  
[] In clinic  
 []  Ice     []  heat 
[]  Ice massage 
[]  Laser  
[]  Anodyne Position: Location:  
 []  Laser with stim 
[]  Other:  Position: Location:  
 []  Vasopneumatic Device Pressure:       [] lo [] med [] hi  
Temperature: [] lo [] med [] hi  
[] Skin assessment post-treatment:  []intact []redness- no adverse reaction 
  []redness  adverse reaction:  
 
49 min Neuromuscular Re-education:  [x]  See flow sheet :  
Rationale: increase strength and improve coordination  to improve the patients ability to improve LE stability and flexibility in order to resume recreational activities With 
 [] TE 
 [] TA 
 [] neuro 
 [] other: Patient Education: [x] Review HEP [] Progressed/Changed HEP based on:  
[] positioning   [] body mechanics   [] transfers   [] heat/ice application   
[] other:   
 
Other Objective/Functional Measures: see goals Pain Level (0-10 scale) post treatment: 0 Summary of Care: 1. Pt will increase FOTO score to 77 points to improve ability to perform ADLs. PN: 65 points Current status: progressing, 75 pts 2. Pt will report being able to sit on a hard surface for 20 mins with minimal to no increased left LE pain/tingling to improve sitting tolerance for work tasks. PN: continues to have pain with hard surfaces. Pt. reports he can only sit 5 minutes before feeling radicular pain. Current status: met 3. Pt will report having minimal to no pain while ascending/descending ladders to improve ease of work tasks.  
PN: reports having increased pain in the HS with ladders Current status: met 4.  Pt will report a decrease in at worst pain to 2/10 to improve performance of functional activities at home.  
PN: 3-4/10 Current status: met ASSESSMENT/Changes in Function: Patient has consistently attended therapy for Left posterior thigh/hamstring pain, with good improvement recently. He has played light ice hockey with his son but reports that he has not yet returned to competitive hockey, but is able to run, sit, and climb ladders. Due to good improvement we will discharge to independent management. Thank you for this referral! 
   
PLAN 
[]Discontinue therapy: []Patient has reached or is progressing toward set goals []Patient is non-compliant or has abdicated 
    []Due to lack of appreciable progress towards set goals Claudeen Clay, PT 4/26/2019  1:19 PM

## 2022-05-03 ENCOUNTER — TRANSCRIBE ORDER (OUTPATIENT)
Dept: REGISTRATION | Age: 37
End: 2022-05-03

## 2022-05-03 ENCOUNTER — HOSPITAL ENCOUNTER (OUTPATIENT)
Dept: GENERAL RADIOLOGY | Age: 37
Discharge: HOME | End: 2022-05-03
Payer: COMMERCIAL

## 2022-05-03 DIAGNOSIS — S93.492A SPRAIN OF ANTERIOR TALOFIBULAR LIGAMENT OF LEFT ANKLE: ICD-10-CM

## 2022-05-03 DIAGNOSIS — S93.492A SPRAIN OF ANTERIOR TALOFIBULAR LIGAMENT OF LEFT ANKLE: Primary | ICD-10-CM

## 2022-05-03 PROCEDURE — 73610 X-RAY EXAM OF ANKLE: CPT

## 2022-07-22 ENCOUNTER — TELEPHONE (OUTPATIENT)
Dept: PHYSICAL THERAPY | Age: 37
End: 2022-07-22

## 2024-10-27 ENCOUNTER — APPOINTMENT (OUTPATIENT)
Facility: HOSPITAL | Age: 39
End: 2024-10-27
Payer: COMMERCIAL

## 2024-10-27 ENCOUNTER — HOSPITAL ENCOUNTER (EMERGENCY)
Facility: HOSPITAL | Age: 39
Discharge: HOME | End: 2024-10-27
Payer: COMMERCIAL

## 2024-10-27 VITALS
RESPIRATION RATE: 18 BRPM | HEIGHT: 72 IN | HEART RATE: 80 BPM | OXYGEN SATURATION: 98 % | TEMPERATURE: 97.9 F | BODY MASS INDEX: 32.51 KG/M2 | SYSTOLIC BLOOD PRESSURE: 144 MMHG | DIASTOLIC BLOOD PRESSURE: 86 MMHG | WEIGHT: 240 LBS

## 2024-10-27 DIAGNOSIS — M79.18 MUSCULOSKELETAL PAIN: ICD-10-CM

## 2024-10-27 DIAGNOSIS — R07.9 CHEST PAIN, UNSPECIFIED TYPE: Primary | ICD-10-CM

## 2024-10-27 LAB
ALBUMIN SERPL-MCNC: 4.4 G/DL (ref 3.4–5)
ALBUMIN/GLOB SERPL: 1.3 (ref 0.8–1.7)
ALP SERPL-CCNC: 76 U/L (ref 45–117)
ALT SERPL-CCNC: 38 U/L (ref 16–61)
ANION GAP SERPL CALC-SCNC: 5 MMOL/L (ref 3–18)
AST SERPL-CCNC: 24 U/L (ref 10–38)
BASOPHILS # BLD: 0 K/UL (ref 0–0.1)
BASOPHILS NFR BLD: 1 % (ref 0–2)
BILIRUB SERPL-MCNC: 0.9 MG/DL (ref 0.2–1)
BUN SERPL-MCNC: 11 MG/DL (ref 7–18)
BUN/CREAT SERPL: 11 (ref 12–20)
CALCIUM SERPL-MCNC: 9.5 MG/DL (ref 8.5–10.1)
CHLORIDE SERPL-SCNC: 106 MMOL/L (ref 100–111)
CO2 SERPL-SCNC: 26 MMOL/L (ref 21–32)
CREAT SERPL-MCNC: 1.02 MG/DL (ref 0.6–1.3)
DIFFERENTIAL METHOD BLD: ABNORMAL
EOSINOPHIL # BLD: 0 K/UL (ref 0–0.4)
EOSINOPHIL NFR BLD: 0 % (ref 0–5)
ERYTHROCYTE [DISTWIDTH] IN BLOOD BY AUTOMATED COUNT: 12.2 % (ref 11.6–14.5)
GLOBULIN SER CALC-MCNC: 3.4 G/DL (ref 2–4)
GLUCOSE SERPL-MCNC: 105 MG/DL (ref 74–99)
HCT VFR BLD AUTO: 48.2 % (ref 36–48)
HGB BLD-MCNC: 15.9 G/DL (ref 13–16)
IMM GRANULOCYTES # BLD AUTO: 0 K/UL (ref 0–0.04)
IMM GRANULOCYTES NFR BLD AUTO: 0 % (ref 0–0.5)
LYMPHOCYTES # BLD: 1.8 K/UL (ref 0.9–3.6)
LYMPHOCYTES NFR BLD: 24 % (ref 21–52)
MCH RBC QN AUTO: 30.1 PG (ref 24–34)
MCHC RBC AUTO-ENTMCNC: 33 G/DL (ref 31–37)
MCV RBC AUTO: 91.1 FL (ref 78–100)
MONOCYTES # BLD: 0.5 K/UL (ref 0.05–1.2)
MONOCYTES NFR BLD: 7 % (ref 3–10)
NEUTS SEG # BLD: 5.1 K/UL (ref 1.8–8)
NEUTS SEG NFR BLD: 68 % (ref 40–73)
NRBC # BLD: 0 K/UL (ref 0–0.01)
NRBC BLD-RTO: 0 PER 100 WBC
PLATELET # BLD AUTO: 218 K/UL (ref 135–420)
PMV BLD AUTO: 11 FL (ref 9.2–11.8)
POTASSIUM SERPL-SCNC: 3.8 MMOL/L (ref 3.5–5.5)
PROT SERPL-MCNC: 7.8 G/DL (ref 6.4–8.2)
RBC # BLD AUTO: 5.29 M/UL (ref 4.35–5.65)
SODIUM SERPL-SCNC: 137 MMOL/L (ref 136–145)
TROPONIN I SERPL HS-MCNC: 17 NG/L (ref 0–78)
TROPONIN I SERPL HS-MCNC: 20 NG/L (ref 0–78)
WBC # BLD AUTO: 7.5 K/UL (ref 4.6–13.2)

## 2024-10-27 PROCEDURE — 80053 COMPREHEN METABOLIC PANEL: CPT

## 2024-10-27 PROCEDURE — 93005 ELECTROCARDIOGRAM TRACING: CPT | Performed by: EMERGENCY MEDICINE

## 2024-10-27 PROCEDURE — 96374 THER/PROPH/DIAG INJ IV PUSH: CPT

## 2024-10-27 PROCEDURE — 6360000002 HC RX W HCPCS: Performed by: NURSE PRACTITIONER

## 2024-10-27 PROCEDURE — 84484 ASSAY OF TROPONIN QUANT: CPT

## 2024-10-27 PROCEDURE — 71046 X-RAY EXAM CHEST 2 VIEWS: CPT

## 2024-10-27 PROCEDURE — 85025 COMPLETE CBC W/AUTO DIFF WBC: CPT

## 2024-10-27 PROCEDURE — 99285 EMERGENCY DEPT VISIT HI MDM: CPT

## 2024-10-27 RX ORDER — IBUPROFEN 600 MG/1
600 TABLET, FILM COATED ORAL 3 TIMES DAILY PRN
Qty: 30 TABLET | Refills: 0 | Status: SHIPPED | OUTPATIENT
Start: 2024-10-27

## 2024-10-27 RX ORDER — KETOROLAC TROMETHAMINE 15 MG/ML
15 INJECTION, SOLUTION INTRAMUSCULAR; INTRAVENOUS ONCE
Status: COMPLETED | OUTPATIENT
Start: 2024-10-27 | End: 2024-10-27

## 2024-10-27 RX ADMIN — KETOROLAC TROMETHAMINE 15 MG: 15 INJECTION, SOLUTION INTRAMUSCULAR; INTRAVENOUS at 15:06

## 2024-10-27 ASSESSMENT — PAIN - FUNCTIONAL ASSESSMENT: PAIN_FUNCTIONAL_ASSESSMENT: 0-10

## 2024-10-27 ASSESSMENT — HEART SCORE
ECG: NORMAL
ECG: NORMAL

## 2024-10-27 ASSESSMENT — PAIN DESCRIPTION - ORIENTATION: ORIENTATION: LEFT

## 2024-10-27 ASSESSMENT — PAIN DESCRIPTION - LOCATION: LOCATION: CHEST

## 2024-10-27 ASSESSMENT — PAIN SCALES - GENERAL: PAINLEVEL_OUTOF10: 8

## 2024-10-27 NOTE — ED PROVIDER NOTES
[facility] EMERGENCY DEPT  EMERGENCY DEPARTMENT HISTORY AND PHYSICAL EXAM      Date: 10/27/2024  Patient Name: Maurice Mckinney  MRN: [id number]  YOB: 1985  Date of evaluation: 10/27/2024  Provider: CARRI Ohsea NP   Note Started: 3:54 PM EDT 10/27/24      History of Presenting Illness     Chief Complaint   Patient presents with    Chest Pain         History Provided By: Patient and medical chart review.    Additional History (Context): Maurice Mckinney is a 39 y.o. male with   Past Medical History:   Diagnosis Date    Hypertension     Kidney stones     Patellar instability of left knee 4/11/2016   who presents with complaints of left-sided chest pain that goes down to his left arm.  Patient reports the pain started on Friday.  Reports pain is like a vibrating sharp pain.  States pain increases with deep breaths.  Patient admits he was at hockey practice on Thursday.  Denies any injury or trauma.  States the pain increased today states this is 7 out of 10 consistently the left side of his chest. Patient denies any fever or chills, dizziness, diaphoresis, dyspnea, pleuritic or exertional symptoms, orthopnea, nausea or vomiting, leg edema.  Denies history of CAD, DVT or PE, hemoptysis, recent surgery or travel, leg swelling, smoking history.  Denies exacerbating or alleviating factors.  Did not take a medication for the symptoms prior to arrival.     PCP: Miguel Medina, DO    No current facility-administered medications for this encounter.     Current Outpatient Medications   Medication Sig Dispense Refill    ibuprofen (ADVIL;MOTRIN) 600 MG tablet Take 1 tablet by mouth 3 times daily as needed for Pain 30 tablet 0    albuterol sulfate HFA (PROVENTIL;VENTOLIN;PROAIR) 108 (90 Base) MCG/ACT inhaler Inhale 2 puffs into the lungs every 4 hours as needed      guaiFENesin-codeine (TUSSI-ORGANIDIN NR) 100-10 MG/5ML syrup Take 5 mLs by mouth.      meloxicam (MOBIC) 15 MG tablet Take 15 mg by mouth daily (with breakfast)

## 2024-10-28 LAB
EKG ATRIAL RATE: 72 BPM
EKG DIAGNOSIS: NORMAL
EKG P AXIS: 77 DEGREES
EKG P-R INTERVAL: 148 MS
EKG Q-T INTERVAL: 422 MS
EKG QRS DURATION: 88 MS
EKG QTC CALCULATION (BAZETT): 462 MS
EKG R AXIS: 84 DEGREES
EKG T AXIS: 54 DEGREES
EKG VENTRICULAR RATE: 72 BPM

## 2024-10-28 PROCEDURE — 93010 ELECTROCARDIOGRAM REPORT: CPT | Performed by: INTERNAL MEDICINE
